# Patient Record
Sex: MALE | Race: WHITE | Employment: STUDENT | URBAN - METROPOLITAN AREA
[De-identification: names, ages, dates, MRNs, and addresses within clinical notes are randomized per-mention and may not be internally consistent; named-entity substitution may affect disease eponyms.]

---
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
## 2020-10-23 ENCOUNTER — NURSE TRIAGE (OUTPATIENT)
Dept: INTERNAL MEDICINE CLINIC | Facility: CLINIC | Age: 20
End: 2020-10-23

## 2020-10-23 NOTE — TELEPHONE ENCOUNTER
Advised mother to take to ER (as per protocol) with explanation; she verbalized understanding. Mother historian. Patient injured back at work, pushing barrels. High level, rated 10, constant pain mid-back. Hurts to stand, lay down, move. Able to walk.

## 2020-10-24 NOTE — TELEPHONE ENCOUNTER
No ER visit seen. Patient's mom states he did not go to ER. He is feeling much better today. Follow up appointment scheduled for Monday.     Future Appointments   Date Time Provider Aga Mondragon   10/26/2020 12:10 PM Ave Dominguez MD [id number]

## 2021-06-24 ENCOUNTER — TELEMEDICINE (OUTPATIENT)
Dept: INTERNAL MEDICINE CLINIC | Facility: CLINIC | Age: 21
End: 2021-06-24
Payer: COMMERCIAL

## 2021-06-24 DIAGNOSIS — J01.90 ACUTE RHINOSINUSITIS: Primary | ICD-10-CM

## 2021-06-24 PROCEDURE — 99213 OFFICE O/P EST LOW 20 MIN: CPT | Performed by: NURSE PRACTITIONER

## 2021-06-24 RX ORDER — AMOXICILLIN AND CLAVULANATE POTASSIUM 875; 125 MG/1; MG/1
1 TABLET, FILM COATED ORAL 2 TIMES DAILY
Qty: 14 TABLET | Refills: 0 | Status: SHIPPED | OUTPATIENT
Start: 2021-06-24 | End: 2021-07-01

## 2021-06-24 NOTE — PROGRESS NOTES
Video Check-In    Alana Sarmiento verbally consents to a Video Check-In visit on 06/24/21. Patient understands and accepts financial responsibility for any deductible, co-insurance and/or co-pays associated with this service.     Queenie Sanchez is in [place] fever. His pain is at a severity of 0/10. He is experiencing no pain. Associated symptoms include congestion, sinus pressure and a sore throat.  Pertinent negatives include no chills, coughing, diaphoresis, ear pain, headaches, hoarse voice, neck pain, shor radiological test and decision making. Appropriate medical decision making and tests are ordered as detailed in the plan of care above.     Eva Ramirez, APRN

## 2021-06-25 ENCOUNTER — TELEPHONE (OUTPATIENT)
Dept: INTERNAL MEDICINE CLINIC | Facility: CLINIC | Age: 21
End: 2021-06-25

## 2021-06-25 NOTE — TELEPHONE ENCOUNTER
Mom states patient is still not feeling well today and needed to call off of work. Mom requesting AYLA Solano change his excuse from work note to include today. Mom states she will come into the office to .

## 2021-12-29 ENCOUNTER — NURSE TRIAGE (OUTPATIENT)
Dept: INTERNAL MEDICINE CLINIC | Facility: CLINIC | Age: 21
End: 2021-12-29

## 2021-12-29 NOTE — TELEPHONE ENCOUNTER
Action Requested: Summary for Provider     []  Critical Lab, Recommendations Needed  [] Need Additional Advice  [x]   FYI    []   Need Orders  [] Need Medications Sent to Pharmacy  []  Other     SUMMARY: Patient had exposure to someone on 12/24/2021 that

## 2022-04-29 ENCOUNTER — TELEMEDICINE (OUTPATIENT)
Dept: INTERNAL MEDICINE CLINIC | Facility: CLINIC | Age: 22
End: 2022-04-29
Payer: COMMERCIAL

## 2022-04-29 DIAGNOSIS — Z20.2 EXPOSURE TO STD: Primary | ICD-10-CM

## 2022-04-29 PROCEDURE — 99213 OFFICE O/P EST LOW 20 MIN: CPT | Performed by: NURSE PRACTITIONER

## 2023-11-28 ENCOUNTER — OFFICE VISIT (OUTPATIENT)
Dept: INTERNAL MEDICINE CLINIC | Facility: CLINIC | Age: 23
End: 2023-11-28

## 2023-11-28 VITALS
WEIGHT: 256 LBS | BODY MASS INDEX: 36.65 KG/M2 | DIASTOLIC BLOOD PRESSURE: 83 MMHG | HEART RATE: 112 BPM | HEIGHT: 70 IN | SYSTOLIC BLOOD PRESSURE: 128 MMHG

## 2023-11-28 DIAGNOSIS — M79.604 RIGHT LEG PAIN: Primary | ICD-10-CM

## 2023-11-28 PROCEDURE — 3074F SYST BP LT 130 MM HG: CPT | Performed by: INTERNAL MEDICINE

## 2023-11-28 PROCEDURE — 3079F DIAST BP 80-89 MM HG: CPT | Performed by: INTERNAL MEDICINE

## 2023-11-28 PROCEDURE — 99213 OFFICE O/P EST LOW 20 MIN: CPT | Performed by: INTERNAL MEDICINE

## 2023-11-28 PROCEDURE — 3008F BODY MASS INDEX DOCD: CPT | Performed by: INTERNAL MEDICINE

## 2023-11-28 NOTE — PATIENT INSTRUCTIONS
Please rest and elevate your right foot and apply ice to the affected area 2-3 times daily. Take Tylenol or Advil as needed for pain relief. Call if no better. Light duty at work until Monday, December 4.

## 2025-05-13 ENCOUNTER — OFFICE VISIT (OUTPATIENT)
Dept: INTERNAL MEDICINE CLINIC | Facility: CLINIC | Age: 25
End: 2025-05-13

## 2025-05-13 VITALS
DIASTOLIC BLOOD PRESSURE: 74 MMHG | HEIGHT: 70 IN | OXYGEN SATURATION: 99 % | SYSTOLIC BLOOD PRESSURE: 116 MMHG | HEART RATE: 88 BPM | BODY MASS INDEX: 35.07 KG/M2 | TEMPERATURE: 98 F | WEIGHT: 245 LBS

## 2025-05-13 DIAGNOSIS — L08.9 LOCAL SKIN INFECTION: Primary | ICD-10-CM

## 2025-05-13 PROBLEM — J30.9 ALLERGIC RHINITIS: Status: ACTIVE | Noted: 2017-06-15

## 2025-05-13 PROBLEM — R05.9 COUGH: Status: ACTIVE | Noted: 2017-06-15

## 2025-05-13 PROCEDURE — 3078F DIAST BP <80 MM HG: CPT | Performed by: NURSE PRACTITIONER

## 2025-05-13 PROCEDURE — 3008F BODY MASS INDEX DOCD: CPT | Performed by: NURSE PRACTITIONER

## 2025-05-13 PROCEDURE — 99213 OFFICE O/P EST LOW 20 MIN: CPT | Performed by: NURSE PRACTITIONER

## 2025-05-13 PROCEDURE — 3074F SYST BP LT 130 MM HG: CPT | Performed by: NURSE PRACTITIONER

## 2025-05-13 RX ORDER — SULFAMETHOXAZOLE AND TRIMETHOPRIM 800; 160 MG/1; MG/1
1 TABLET ORAL 2 TIMES DAILY
Qty: 6 TABLET | Refills: 0 | Status: SHIPPED | OUTPATIENT
Start: 2025-05-13 | End: 2025-05-16

## 2025-05-13 NOTE — PATIENT INSTRUCTIONS
Bactrim 1 pill 2 times daily for 3 days - complete all of the medication, no leftover pills  Clean the area with soap and water several times daily.  I would recommend keeping it covered if you go back to the gym to prevent further contamination.  You should be reevaluated if the redness, swelling gets any worse, or if the redness spreads up your finger or you develop fevers/chills.

## 2025-05-13 NOTE — PROGRESS NOTES
Subjective:   Jovanni Yusuf is a 24 year old male who presents for Finger Pain     2d h/o redness/pain/swelling to R pointer finger surrounding area of open skin. Thinks it happened at the gym - punching bags, other exposures. No spreading redness.  Squeezed and some purulent material came out this morning.  No fevers/chills/systemic symptoms.        History/Other:    Chief Complaint Reviewed and Verified  No Further Nursing Notes to   Review  Tobacco Reviewed  Allergies Reviewed  Medications Reviewed    Problem List Reviewed  Medical History Reviewed  Surgical History   Reviewed  Family History Reviewed  Social History Reviewed         Tobacco:  Tobacco Use[1]  E-Cigarettes/Vaping       Questions Responses    E-Cigarette Use Never User          E-Cigarette/Vaping Substances       Questions Responses    Nicotine No    THC No    CBD No    Flavoring No          E-Cigarette/Vaping Devices       Questions Responses    Disposable No    Pre-filled or Refillable Cartridge No    Refillable Tank No    Pre-filled Pod No           Quit smoking cigarettes 1 month ago.      Current Medications[2]      Review of Systems:  Review of Systems  10 point review of systems otherwise negative with the exception of HPI and assessment and plan.    Objective:   /74   Pulse 88   Temp 97.5 °F (36.4 °C) (Temporal)   Ht 5' 10\" (1.778 m)   Wt 245 lb (111.1 kg)   SpO2 99%   BMI 35.15 kg/m²  Estimated body mass index is 35.15 kg/m² as calculated from the following:    Height as of this encounter: 5' 10\" (1.778 m).    Weight as of this encounter: 245 lb (111.1 kg).      Physical Exam  Vitals reviewed.   Constitutional:       General: He is not in acute distress.     Appearance: He is not ill-appearing.   Cardiovascular:      Rate and Rhythm: Normal rate.   Pulmonary:      Effort: Pulmonary effort is normal. No respiratory distress.   Skin:     General: Skin is warm and dry.      Comments: Distal right pointer finger with less  than 1 cm open area with surrounding erythema, mild edema, scant purulent discharge.  Not over the joint, no decreased range of motion.  No streaking redness.   Neurological:      Mental Status: He is alert.         Assessment & Plan:   1. Local skin infection (Primary)  -     Sulfamethoxazole-Trimethoprim; Take 1 tablet by mouth 2 (two) times daily for 3 days.  Dispense: 6 tablet; Refill: 0  - The wound appears mildly infected on exam.  Will treat with short course of Bactrim.  Wound care discussed including washing the area several times daily with soap and water, keep covered with a bandage if he will be going to the gym to prevent further contamination.  - Return for evaluation for any increased redness, warmth, swelling, or any fevers/chills.        Return if symptoms worsen or fail to improve.    DENNY Sutton, 5/13/2025, 11:17 AM     This note was prepared using Dragon Medical voice recognition dictation software. As a result errors may occur. When identified, these errors have been corrected. While every attempt is made to correct errors during dictation discrepancies may still exist.       [1]   Social History  Tobacco Use   Smoking Status Former    Types: Cigarettes   Smokeless Tobacco Never   Tobacco Comments    Quit March 2025   [2]   Current Outpatient Medications   Medication Sig Dispense Refill    sulfamethoxazole-trimethoprim -160 MG Oral Tab per tablet Take 1 tablet by mouth 2 (two) times daily for 3 days. 6 tablet 0    cetirizine 10 MG Oral Tab Take 1 tablet (10 mg total) by mouth daily.

## 2025-05-22 ENCOUNTER — LAB ENCOUNTER (OUTPATIENT)
Dept: LAB | Age: 25
End: 2025-05-22
Attending: INTERNAL MEDICINE
Payer: COMMERCIAL

## 2025-05-22 ENCOUNTER — OFFICE VISIT (OUTPATIENT)
Dept: INTERNAL MEDICINE CLINIC | Facility: CLINIC | Age: 25
End: 2025-05-22
Payer: COMMERCIAL

## 2025-05-22 VITALS
SYSTOLIC BLOOD PRESSURE: 121 MMHG | BODY MASS INDEX: 34.22 KG/M2 | WEIGHT: 239 LBS | HEIGHT: 70 IN | HEART RATE: 74 BPM | DIASTOLIC BLOOD PRESSURE: 74 MMHG

## 2025-05-22 DIAGNOSIS — R10.11 RUQ PAIN: ICD-10-CM

## 2025-05-22 DIAGNOSIS — R10.11 RUQ PAIN: Primary | ICD-10-CM

## 2025-05-22 DIAGNOSIS — Z11.3 SCREENING FOR STD (SEXUALLY TRANSMITTED DISEASE): ICD-10-CM

## 2025-05-22 LAB
ALBUMIN SERPL-MCNC: 5.5 G/DL (ref 3.2–4.8)
ALBUMIN/GLOB SERPL: 2 {RATIO} (ref 1–2)
ALP LIVER SERPL-CCNC: 81 U/L (ref 45–117)
ALT SERPL-CCNC: 87 U/L (ref 10–49)
ANION GAP SERPL CALC-SCNC: 10 MMOL/L (ref 0–18)
APPEARANCE: CLEAR
AST SERPL-CCNC: 37 U/L (ref ?–34)
BASOPHILS # BLD AUTO: 0.03 X10(3) UL (ref 0–0.2)
BASOPHILS NFR BLD AUTO: 0.3 %
BILIRUB SERPL-MCNC: 1.4 MG/DL (ref 0.3–1.2)
BILIRUBIN: NEGATIVE
BUN BLD-MCNC: 12 MG/DL (ref 9–23)
BUN/CREAT SERPL: 11 (ref 10–20)
CALCIUM BLD-MCNC: 10 MG/DL (ref 8.7–10.4)
CHLORIDE SERPL-SCNC: 103 MMOL/L (ref 98–112)
CO2 SERPL-SCNC: 26 MMOL/L (ref 21–32)
CREAT BLD-MCNC: 1.09 MG/DL (ref 0.7–1.3)
DEPRECATED RDW RBC AUTO: 41 FL (ref 35.1–46.3)
EGFRCR SERPLBLD CKD-EPI 2021: 97 ML/MIN/1.73M2 (ref 60–?)
EOSINOPHIL # BLD AUTO: 0.06 X10(3) UL (ref 0–0.7)
EOSINOPHIL NFR BLD AUTO: 0.7 %
ERYTHROCYTE [DISTWIDTH] IN BLOOD BY AUTOMATED COUNT: 12.7 % (ref 11–15)
FASTING STATUS PATIENT QL REPORTED: YES
GLOBULIN PLAS-MCNC: 2.7 G/DL (ref 2–3.5)
GLUCOSE (URINE DIPSTICK): NEGATIVE MG/DL
GLUCOSE BLD-MCNC: 92 MG/DL (ref 70–99)
HBV SURFACE AG SER-ACNC: <0.1 [IU]/L
HBV SURFACE AG SERPL QL IA: NONREACTIVE
HCT VFR BLD AUTO: 48 % (ref 39–53)
HCV AB SERPL QL IA: NONREACTIVE
HGB BLD-MCNC: 16.4 G/DL (ref 13–17.5)
IMM GRANULOCYTES # BLD AUTO: 0.01 X10(3) UL (ref 0–1)
IMM GRANULOCYTES NFR BLD: 0.1 %
KETONES (URINE DIPSTICK): NEGATIVE MG/DL
LEUKOCYTES: NEGATIVE
LYMPHOCYTES # BLD AUTO: 1.95 X10(3) UL (ref 1–4)
LYMPHOCYTES NFR BLD AUTO: 21.6 %
MCH RBC QN AUTO: 29.9 PG (ref 26–34)
MCHC RBC AUTO-ENTMCNC: 34.2 G/DL (ref 31–37)
MCV RBC AUTO: 87.6 FL (ref 80–100)
MONOCYTES # BLD AUTO: 0.62 X10(3) UL (ref 0.1–1)
MONOCYTES NFR BLD AUTO: 6.9 %
MULTISTIX LOT#: NORMAL NUMERIC
NEUTROPHILS # BLD AUTO: 6.36 X10 (3) UL (ref 1.5–7.7)
NEUTROPHILS # BLD AUTO: 6.36 X10(3) UL (ref 1.5–7.7)
NEUTROPHILS NFR BLD AUTO: 70.4 %
NITRITE, URINE: NEGATIVE
OCCULT BLOOD: NEGATIVE
OSMOLALITY SERPL CALC.SUM OF ELEC: 287 MOSM/KG (ref 275–295)
PH, URINE: 6.5 (ref 4.5–8)
PLATELET # BLD AUTO: 361 10(3)UL (ref 150–450)
POTASSIUM SERPL-SCNC: 4 MMOL/L (ref 3.5–5.1)
PROT SERPL-MCNC: 8.2 G/DL (ref 5.7–8.2)
RBC # BLD AUTO: 5.48 X10(6)UL (ref 4.3–5.7)
SODIUM SERPL-SCNC: 139 MMOL/L (ref 136–145)
SPECIFIC GRAVITY: 1.02 (ref 1–1.03)
T PALLIDUM AB SER QL IA: NONREACTIVE
UROBILINOGEN,SEMI-QN: 0.2 MG/DL (ref 0–1.9)
WBC # BLD AUTO: 9 X10(3) UL (ref 4–11)

## 2025-05-22 PROCEDURE — 87340 HEPATITIS B SURFACE AG IA: CPT

## 2025-05-22 PROCEDURE — 36415 COLL VENOUS BLD VENIPUNCTURE: CPT

## 2025-05-22 PROCEDURE — 99213 OFFICE O/P EST LOW 20 MIN: CPT | Performed by: INTERNAL MEDICINE

## 2025-05-22 PROCEDURE — 86803 HEPATITIS C AB TEST: CPT

## 2025-05-22 PROCEDURE — 3078F DIAST BP <80 MM HG: CPT | Performed by: INTERNAL MEDICINE

## 2025-05-22 PROCEDURE — 3074F SYST BP LT 130 MM HG: CPT | Performed by: INTERNAL MEDICINE

## 2025-05-22 PROCEDURE — 85025 COMPLETE CBC W/AUTO DIFF WBC: CPT

## 2025-05-22 PROCEDURE — 86780 TREPONEMA PALLIDUM: CPT

## 2025-05-22 PROCEDURE — 80053 COMPREHEN METABOLIC PANEL: CPT

## 2025-05-22 PROCEDURE — 81002 URINALYSIS NONAUTO W/O SCOPE: CPT | Performed by: INTERNAL MEDICINE

## 2025-05-22 PROCEDURE — 3008F BODY MASS INDEX DOCD: CPT | Performed by: INTERNAL MEDICINE

## 2025-05-22 PROCEDURE — 87389 HIV-1 AG W/HIV-1&-2 AB AG IA: CPT

## 2025-05-22 NOTE — PROGRESS NOTES
Jovanni Yusuf is a 24 year old male.  Chief Complaint   Patient presents with    Acute     Right abdominal pain x2.5 weeks      HPI:       The following individual(s) verbally consented to be recorded using ambient AI listening technology and understand that they can each withdraw their consent to this listening technology at any point by asking the clinician to turn off or pause the recording:    Patient name: Jovanni Yusuf    History of Present Illness  Jovanni Yusuf is a 24 year old male who presents with right upper quadrant abdominal discomfort.    He has been experiencing sharp right upper quadrant abdominal pain for the past two and a half weeks, with varying intensity, occurring about 70-80% of the time. The pain is not related to eating, but his appetite has decreased slightly. No nausea, vomiting, or diarrhea, but he reports occasional lightheadedness and constipation. The onset of the abdominal discomfort coincided with the completion of a course of Bactrim for a finger infection.    He experiences urinary symptoms, including a frequent urge to urinate with minimal output and discomfort at the tip of the urethra before urination. No discharge is noted. He has expressed concern about sexually transmitted diseases due to these symptoms.    During the review of symptoms, he noted that taking a deep breath makes the pain more noticeable but not more painful. He also experiences mild pain in the stomach area when lifting objects at work.         Current Medications[1]   Past Medical History[2]   Past Surgical History[3]   Social History:  Short Social Hx on File[4]   Family History[5]   Allergies[6]     REVIEW OF SYSTEMS:   Review of Systems   Review of Systems   Constitutional: Negative for activity change, appetite change and fever.   HENT: Negative for congestion and voice change.    Respiratory: Negative for cough and shortness of breath.    Cardiovascular: Negative for chest pain.   Gastrointestinal:  Negative for abdominal distention, abdominal pain and vomiting.   Genitourinary: Negative for hematuria.   Skin: Negative for wound.   Psychiatric/Behavioral: Negative for behavioral problems.   Wt Readings from Last 5 Encounters:   05/22/25 239 lb (108.4 kg)   05/13/25 245 lb (111.1 kg)   11/28/23 256 lb (116.1 kg)   11/16/19 206 lb (93.4 kg) (95%, Z= 1.62)*   11/13/19 207 lb (93.9 kg) (95%, Z= 1.64)*     * Growth percentiles are based on [facility] (Boys, 2-20 Years) data.     Body mass index is 34.29 kg/m².      EXAM:   /74   Pulse 74   Ht 5' 10\" (1.778 m)   Wt 239 lb (108.4 kg)   BMI 34.29 kg/m²   Physical Exam   Constitutional:       Appearance: Normal appearance.   HENT:      Head: Normocephalic.   Eyes:      Conjunctiva/sclera: Conjunctivae normal.   Breast:  Normal bilateral breast exam. No palpable masses or nodules.   No nipples asymmetry or discharge. No skin changes   Cardiovascular:      Rate and Rhythm: Normal rate and regular rhythm.      Heart sounds: Normal heart sounds. No murmur heard.  Pulmonary:      Effort: Pulmonary effort is normal.      Breath sounds: Normal breath sounds. No rhonchi or rales.   Abdominal:      General: Bowel sounds are normal.      Palpations: Abdomen is soft.      Tenderness: There is no abdominal tenderness.   Musculoskeletal:      Cervical back: Neck supple.      Right lower leg: No edema.      Left lower leg: No edema.   Skin:     General: Skin is warm and dry.   Neurological:      General: No focal deficit present.      Mental Status: He is alert and oriented to person, place, and time. Mental status is at baseline.   Psychiatric:         Mood and Affect: Mood normal.         Behavior: Behavior normal.       ASSESSMENT AND PLAN:   Assessment & Plan       Assessment & Plan  Right upper quadrant abdominal pain  Intermittent sharp pain in the right upper quadrant for the past two and a half weeks, persistent but not severe, with increased sensitivity upon deep  breathing. No associated nausea or vomiting. Differential diagnosis includes gallbladder issues such as cholelithiasis or cholecystitis, and potential hepatic involvement.  - Order liver function tests  - Order ultrasound of the right upper quadrant  - Advise increased fluid intake  - Recommend regular diet as tolerated  - Instruct to go to the ER if abdominal pain worsens    Urinary urgency  Screening for std   Experiencing urinary urgency with discomfort at the urethral meatus before urination. No urinary tract infection detected on office test. Requested STD testing due to discomfort. STD panel will include tests for hepatitis, HIV, and chlamydia.  - Perform STD panel including tests for hepatitis, HIV, and chlamydia         The patient indicates understanding of these issues and agrees to the plan.      Denise Mari MD     This note was created by Dragon voice recognition. Errors in content may be related to improper recognition by the system; efforts to review and correct have been done but errors may still exist. Please be advised the primary purpose of this note is for me to communicate medical care. Standard sentence structure is not always used. Medical terminology and medical abbreviations may be used. There may be grammatical, typographical, and automated fill ins that may have errors missed in proofreading.        [1]   Current Outpatient Medications   Medication Sig Dispense Refill    cetirizine 10 MG Oral Tab Take 1 tablet (10 mg total) by mouth daily.     [2]   Past Medical History:   Allergic rhinitis   [3]   Past Surgical History:  Procedure Laterality Date    Tonsillectomy     [4]   Social History  Socioeconomic History    Marital status: Single   Tobacco Use    Smoking status: Former     Types: Cigarettes    Smokeless tobacco: Never    Tobacco comments:     Quit March 2025   Vaping Use    Vaping status: Never Used   Substance and Sexual Activity    Alcohol use: Never    Drug use: Never     Sexual activity: Not Currently   [5]   Family History  Problem Relation Age of Onset    Heart Disease Maternal Grandmother     Cancer Maternal Grandmother    [6] No Known Allergies

## 2025-05-23 ENCOUNTER — TELEPHONE (OUTPATIENT)
Dept: INTERNAL MEDICINE CLINIC | Facility: CLINIC | Age: 25
End: 2025-05-23

## 2025-05-23 DIAGNOSIS — R74.8 ELEVATED LIVER ENZYMES: Primary | ICD-10-CM

## 2025-05-23 LAB
C TRACH DNA SPEC QL NAA+PROBE: NEGATIVE
N GONORRHOEA DNA SPEC QL NAA+PROBE: NEGATIVE

## (undated) NOTE — LETTER
11/28/2023              [address] [id number]         To Whom It May Concern,    Mr. Cristian Cheney was seen by me today for a work-related injury to his right lower leg. For now, he should be on light duty at work, returning to his usual job without restrictions on Monday, December 4.     Sincerely,    Arnav Melissa MD  [facility], [address] [id number] [phone]        Document electronically generated by:  Arnav Melissa MD

## (undated) NOTE — LETTER
6/24/2021          To Whom It May Concern:    Jake Farr is currently under my medical care and may not return to work at this time. Please excuse Arielle Guaman for 1 days. He may return to work on 6/25/2021 Friday. Activity is restricted as follows: none.

## (undated) NOTE — LETTER
10/10/2019          To Whom It May Concern:    Анна Olivarez is currently under my medical care and may not return to work at this time. Please excuse Edita De La Rosa for 2 days.   (10/9/2019 and 10/10/2019)  He may return to work on 10/11/2019 with no restriction

## (undated) NOTE — LETTER
6/25/2021          To Whom It May Concern:    Jose Henderson is currently under my medical care and may not return to work at this time. Please excuse Timothy Pugh for 2 days. He may return to work on Saturday 6/26/2021.   Activity is restricted as follows: non

## (undated) NOTE — ED AVS SNAPSHOT
René Knight   MRN: [id number]    Department:  [facility] Emergency Department   Date of Visit:  10/9/2019           Disclosure     Insurance plans vary and the physician(s) referred by the ER may not be covered by your plan.  Please contact you CARE PHYSICIAN AT ONCE OR RETURN IMMEDIATELY TO THE EMERGENCY DEPARTMENT. If you have been prescribed any medication(s), please fill your prescription right away and begin taking the medication(s) as directed.   If you believe that any of the medications